# Patient Record
Sex: FEMALE | Race: BLACK OR AFRICAN AMERICAN | NOT HISPANIC OR LATINO
[De-identification: names, ages, dates, MRNs, and addresses within clinical notes are randomized per-mention and may not be internally consistent; named-entity substitution may affect disease eponyms.]

---

## 2019-10-21 ENCOUNTER — APPOINTMENT (OUTPATIENT)
Dept: UROLOGY | Facility: CLINIC | Age: 64
End: 2019-10-21

## 2019-10-21 PROBLEM — Z00.00 ENCOUNTER FOR PREVENTIVE HEALTH EXAMINATION: Status: ACTIVE | Noted: 2019-10-21

## 2019-11-04 ENCOUNTER — APPOINTMENT (OUTPATIENT)
Dept: UROLOGY | Facility: CLINIC | Age: 64
End: 2019-11-04
Payer: COMMERCIAL

## 2019-11-04 VITALS
BODY MASS INDEX: 26.49 KG/M2 | HEART RATE: 87 BPM | RESPIRATION RATE: 15 BRPM | WEIGHT: 159 LBS | SYSTOLIC BLOOD PRESSURE: 136 MMHG | HEIGHT: 65 IN | DIASTOLIC BLOOD PRESSURE: 80 MMHG

## 2019-11-04 DIAGNOSIS — Z86.39 PERSONAL HISTORY OF OTHER ENDOCRINE, NUTRITIONAL AND METABOLIC DISEASE: ICD-10-CM

## 2019-11-04 DIAGNOSIS — Z78.9 OTHER SPECIFIED HEALTH STATUS: ICD-10-CM

## 2019-11-04 DIAGNOSIS — Z87.42 PERSONAL HISTORY OF OTHER DISEASES OF THE FEMALE GENITAL TRACT: ICD-10-CM

## 2019-11-04 DIAGNOSIS — Z80.0 FAMILY HISTORY OF MALIGNANT NEOPLASM OF DIGESTIVE ORGANS: ICD-10-CM

## 2019-11-04 PROCEDURE — 99204 OFFICE O/P NEW MOD 45 MIN: CPT

## 2019-11-04 RX ORDER — SITAGLIPTIN AND METFORMIN HYDROCHLORIDE 50; 1000 MG/1; MG/1
TABLET, FILM COATED ORAL
Refills: 0 | Status: ACTIVE | COMMUNITY

## 2019-11-04 RX ORDER — GLIMEPIRIDE 4 MG/1
TABLET ORAL
Refills: 0 | Status: ACTIVE | COMMUNITY

## 2019-11-09 NOTE — HISTORY OF PRESENT ILLNESS
MONITOR response to TREATMENT. [FreeTextEntry1] : 63 yo F with history of back pain, mid lower back but sometimes travels to flank bilaterally\par Underwent CT as part of workup\par Found to have atrophic right kidney with a renal mass\par Per pt, has known about atrophic right kidney since 31 yrs ago\par no history of UTI\par no history of kidney stones

## 2019-11-09 NOTE — PHYSICAL EXAM
[General Appearance - Well Developed] : well developed [General Appearance - Well Nourished] : well nourished [Well Groomed] : well groomed [Normal Appearance] : normal appearance [Edema] : no peripheral edema [General Appearance - In No Acute Distress] : no acute distress [Exaggerated Use Of Accessory Muscles For Inspiration] : no accessory muscle use [Abdomen Soft] : soft [Respiration, Rhythm And Depth] : normal respiratory rhythm and effort [Abdomen Tenderness] : non-tender [Costovertebral Angle Tenderness] : no ~M costovertebral angle tenderness [Urinary Bladder Findings] : the bladder was normal on palpation [FreeTextEntry1] : well healed abdominal scar [No Focal Deficits] : no focal deficits [] : no rash [Normal Station and Gait] : the gait and station were normal for the patient's age [Affect] : the affect was normal [Oriented To Time, Place, And Person] : oriented to person, place, and time [Mood] : the mood was normal [Not Anxious] : not anxious [No Palpable Adenopathy] : no palpable adenopathy

## 2019-11-09 NOTE — ASSESSMENT
[FreeTextEntry1] : 63 yo F with right atrophic kidney with renal mass\par \par - Reviewed CT imaging. Confirmed reported findings. Of note, pt's left kidney has compensatory hypertrophy\par - Discussed options with pt. Given atrophy, would recommend radical nephrectomy even though renal mass is small. In relation to atrophic kidney, it takes up about a third. Discussed all risks and benefits. Pt would like to proceed\par - Schedule laparoscopic right radical nephrectomy in the near future\par

## 2019-11-11 LAB
APPEARANCE: CLEAR
BACTERIA UR CULT: NORMAL
BACTERIA: NEGATIVE
BILIRUBIN URINE: NEGATIVE
BLOOD URINE: NEGATIVE
COLOR: NORMAL
GLUCOSE QUALITATIVE U: ABNORMAL
HYALINE CASTS: 2 /LPF
KETONES URINE: NEGATIVE
LEUKOCYTE ESTERASE URINE: NEGATIVE
MICROSCOPIC-UA: NORMAL
NITRITE URINE: NEGATIVE
PH URINE: 6.5
PROTEIN URINE: NEGATIVE
RED BLOOD CELLS URINE: 4 /HPF
SPECIFIC GRAVITY URINE: 1.02
SQUAMOUS EPITHELIAL CELLS: 2 /HPF
UROBILINOGEN URINE: NORMAL
WHITE BLOOD CELLS URINE: 3 /HPF

## 2020-01-02 ENCOUNTER — OUTPATIENT (OUTPATIENT)
Dept: OUTPATIENT SERVICES | Facility: HOSPITAL | Age: 65
LOS: 1 days | End: 2020-01-02
Payer: COMMERCIAL

## 2020-01-02 VITALS
OXYGEN SATURATION: 98 % | RESPIRATION RATE: 17 BRPM | HEART RATE: 75 BPM | TEMPERATURE: 98 F | HEIGHT: 65 IN | SYSTOLIC BLOOD PRESSURE: 105 MMHG | WEIGHT: 162.92 LBS | DIASTOLIC BLOOD PRESSURE: 71 MMHG

## 2020-01-02 DIAGNOSIS — Z98.891 HISTORY OF UTERINE SCAR FROM PREVIOUS SURGERY: Chronic | ICD-10-CM

## 2020-01-02 DIAGNOSIS — Z90.710 ACQUIRED ABSENCE OF BOTH CERVIX AND UTERUS: Chronic | ICD-10-CM

## 2020-01-02 DIAGNOSIS — Z01.818 ENCOUNTER FOR OTHER PREPROCEDURAL EXAMINATION: ICD-10-CM

## 2020-01-02 DIAGNOSIS — Z98.890 OTHER SPECIFIED POSTPROCEDURAL STATES: Chronic | ICD-10-CM

## 2020-01-02 DIAGNOSIS — E11.9 TYPE 2 DIABETES MELLITUS WITHOUT COMPLICATIONS: ICD-10-CM

## 2020-01-02 DIAGNOSIS — N28.89 OTHER SPECIFIED DISORDERS OF KIDNEY AND URETER: ICD-10-CM

## 2020-01-02 LAB
APPEARANCE UR: CLEAR — SIGNIFICANT CHANGE UP
BILIRUB UR-MCNC: NEGATIVE — SIGNIFICANT CHANGE UP
BLD GP AB SCN SERPL QL: SIGNIFICANT CHANGE UP
COLOR SPEC: YELLOW — SIGNIFICANT CHANGE UP
DIFF PNL FLD: NEGATIVE — SIGNIFICANT CHANGE UP
GLUCOSE UR QL: NEGATIVE — SIGNIFICANT CHANGE UP
KETONES UR-MCNC: NEGATIVE — SIGNIFICANT CHANGE UP
LEUKOCYTE ESTERASE UR-ACNC: NEGATIVE — SIGNIFICANT CHANGE UP
NITRITE UR-MCNC: NEGATIVE — SIGNIFICANT CHANGE UP
PH UR: 5 — SIGNIFICANT CHANGE UP (ref 5–8)
PROT UR-MCNC: NEGATIVE — SIGNIFICANT CHANGE UP
SP GR SPEC: 1.01 — SIGNIFICANT CHANGE UP (ref 1.01–1.02)
UROBILINOGEN FLD QL: NEGATIVE — SIGNIFICANT CHANGE UP

## 2020-01-02 PROCEDURE — G0463: CPT

## 2020-01-02 NOTE — H&P PST ADULT - NSICDXPASTSURGICALHX_GEN_ALL_CORE_FT
PAST SURGICAL HISTORY:  H/O  section one    H/O exploratory laparotomy for endometerosis    H/O total hysterectomy

## 2020-01-02 NOTE — H&P PST ADULT - NSICDXFAMILYHX_GEN_ALL_CORE_FT
FAMILY HISTORY:  Family history of diabetes mellitus (DM), mother and father  Family history of diabetes mellitus (DM), sister  FHx: colon cancer, father

## 2020-01-02 NOTE — H&P PST ADULT - HISTORY OF PRESENT ILLNESS
64 year old Black female with history right kidney hydronephrosis, and  a diabetic. Pt had seen her MD for back pain sometime in October.  Pt had several tests a sonogram revealed something on right kidney. An IVP showed a tumor not stones on the right kidney. Pt had a consultation with her doctor and was referred to Dr. Fink for possible surgery. Pt schedule for Laparoscopic right radical nephrectomy on 1/14/2020. Pt instructed to take 22 u of Levemir the night before surgery, pt verbalized understanding and reason why this amount is important.

## 2020-01-02 NOTE — H&P PST ADULT - RS GEN PE MLT RESP DETAILS PC
airway patent/clear to auscultation bilaterally/good air movement/normal/breath sounds equal/respirations non-labored

## 2020-01-02 NOTE — H&P PST ADULT - AIRWAY
normal Niacinamide Pregnancy And Lactation Text: These medications are considered safe during pregnancy.

## 2020-01-02 NOTE — H&P PST ADULT - NSICDXPROBLEM_GEN_ALL_CORE_FT
PROBLEM DIAGNOSES  Problem: Other specified disorders of kidney and ureter  Assessment and Plan: Schedule for laparoscopic right radical nephrectomy    Problem: DM (diabetes mellitus)  Assessment and Plan: monitor glucose pre and post procedure continue antidiabetic meds after surgery.

## 2020-01-02 NOTE — H&P PST ADULT - NEGATIVE OPHTHALMOLOGIC SYMPTOMS
no diplopia/no lacrimation L/no lacrimation R/no discharge L/no blurred vision R/no photophobia/no blurred vision L

## 2020-01-02 NOTE — H&P PST ADULT - NEUROLOGICAL DETAILS
alert and oriented x 3/responds to pain/sensation intact/deep reflexes intact/responds to verbal commands

## 2020-01-03 LAB
CULTURE RESULTS: SIGNIFICANT CHANGE UP
SPECIMEN SOURCE: SIGNIFICANT CHANGE UP

## 2020-01-14 ENCOUNTER — APPOINTMENT (OUTPATIENT)
Dept: UROLOGY | Facility: HOSPITAL | Age: 65
End: 2020-01-14

## 2020-01-14 ENCOUNTER — INPATIENT (INPATIENT)
Facility: HOSPITAL | Age: 65
LOS: 0 days | Discharge: HOME | DRG: 661 | End: 2020-01-15
Attending: UROLOGY | Admitting: UROLOGY
Payer: COMMERCIAL

## 2020-01-14 ENCOUNTER — RESULT REVIEW (OUTPATIENT)
Age: 65
End: 2020-01-14

## 2020-01-14 VITALS
RESPIRATION RATE: 17 BRPM | WEIGHT: 162.92 LBS | SYSTOLIC BLOOD PRESSURE: 105 MMHG | DIASTOLIC BLOOD PRESSURE: 71 MMHG | HEIGHT: 65 IN | HEART RATE: 75 BPM | OXYGEN SATURATION: 98 % | TEMPERATURE: 98 F

## 2020-01-14 DIAGNOSIS — Z90.710 ACQUIRED ABSENCE OF BOTH CERVIX AND UTERUS: Chronic | ICD-10-CM

## 2020-01-14 DIAGNOSIS — N28.89 OTHER SPECIFIED DISORDERS OF KIDNEY AND URETER: ICD-10-CM

## 2020-01-14 DIAGNOSIS — Z98.891 HISTORY OF UTERINE SCAR FROM PREVIOUS SURGERY: Chronic | ICD-10-CM

## 2020-01-14 DIAGNOSIS — Z98.890 OTHER SPECIFIED POSTPROCEDURAL STATES: Chronic | ICD-10-CM

## 2020-01-14 LAB — BLD GP AB SCN SERPL QL: SIGNIFICANT CHANGE UP

## 2020-01-14 PROCEDURE — 88307 TISSUE EXAM BY PATHOLOGIST: CPT | Mod: 26

## 2020-01-14 PROCEDURE — 50545 LAPARO RADICAL NEPHRECTOMY: CPT | Mod: AS,RT

## 2020-01-14 PROCEDURE — 50545 LAPARO RADICAL NEPHRECTOMY: CPT | Mod: RT

## 2020-01-14 RX ORDER — INSULIN GLARGINE 100 [IU]/ML
30 INJECTION, SOLUTION SUBCUTANEOUS AT BEDTIME
Refills: 0 | Status: DISCONTINUED | OUTPATIENT
Start: 2020-01-14 | End: 2020-01-15

## 2020-01-14 RX ORDER — SITAGLIPTIN AND METFORMIN HYDROCHLORIDE 500; 50 MG/1; MG/1
1 TABLET, FILM COATED ORAL
Qty: 0 | Refills: 0 | DISCHARGE

## 2020-01-14 RX ORDER — ONDANSETRON 8 MG/1
4 TABLET, FILM COATED ORAL ONCE
Refills: 0 | Status: DISCONTINUED | OUTPATIENT
Start: 2020-01-14 | End: 2020-01-14

## 2020-01-14 RX ORDER — THIAMINE MONONITRATE (VIT B1) 100 MG
300 TABLET ORAL
Qty: 0 | Refills: 0 | DISCHARGE

## 2020-01-14 RX ORDER — INSULIN DETEMIR 100/ML (3)
30 INSULIN PEN (ML) SUBCUTANEOUS AT BEDTIME
Refills: 0 | Status: DISCONTINUED | OUTPATIENT
Start: 2020-01-14 | End: 2020-01-14

## 2020-01-14 RX ORDER — HYDROMORPHONE HYDROCHLORIDE 2 MG/ML
0.5 INJECTION INTRAMUSCULAR; INTRAVENOUS; SUBCUTANEOUS
Refills: 0 | Status: DISCONTINUED | OUTPATIENT
Start: 2020-01-14 | End: 2020-01-14

## 2020-01-14 RX ORDER — OXYCODONE AND ACETAMINOPHEN 5; 325 MG/1; MG/1
1 TABLET ORAL EVERY 4 HOURS
Refills: 0 | Status: DISCONTINUED | OUTPATIENT
Start: 2020-01-14 | End: 2020-01-15

## 2020-01-14 RX ORDER — CEFAZOLIN SODIUM 1 G
2000 VIAL (EA) INJECTION EVERY 8 HOURS
Refills: 0 | Status: COMPLETED | OUTPATIENT
Start: 2020-01-14 | End: 2020-01-14

## 2020-01-14 RX ORDER — GLUCAGON INJECTION, SOLUTION 0.5 MG/.1ML
1 INJECTION, SOLUTION SUBCUTANEOUS ONCE
Refills: 0 | Status: DISCONTINUED | OUTPATIENT
Start: 2020-01-14 | End: 2020-01-15

## 2020-01-14 RX ORDER — INSULIN LISPRO 100/ML
VIAL (ML) SUBCUTANEOUS
Refills: 0 | Status: DISCONTINUED | OUTPATIENT
Start: 2020-01-14 | End: 2020-01-15

## 2020-01-14 RX ORDER — SODIUM CHLORIDE 9 MG/ML
1000 INJECTION, SOLUTION INTRAVENOUS
Refills: 0 | Status: DISCONTINUED | OUTPATIENT
Start: 2020-01-14 | End: 2020-01-14

## 2020-01-14 RX ORDER — ACETAMINOPHEN 500 MG
1000 TABLET ORAL ONCE
Refills: 0 | Status: COMPLETED | OUTPATIENT
Start: 2020-01-14 | End: 2020-01-14

## 2020-01-14 RX ORDER — DEXTROSE 50 % IN WATER 50 %
12.5 SYRINGE (ML) INTRAVENOUS ONCE
Refills: 0 | Status: DISCONTINUED | OUTPATIENT
Start: 2020-01-14 | End: 2020-01-15

## 2020-01-14 RX ORDER — SODIUM CHLORIDE 9 MG/ML
3 INJECTION INTRAMUSCULAR; INTRAVENOUS; SUBCUTANEOUS EVERY 8 HOURS
Refills: 0 | Status: DISCONTINUED | OUTPATIENT
Start: 2020-01-14 | End: 2020-01-14

## 2020-01-14 RX ORDER — DEXTROSE 50 % IN WATER 50 %
15 SYRINGE (ML) INTRAVENOUS ONCE
Refills: 0 | Status: DISCONTINUED | OUTPATIENT
Start: 2020-01-14 | End: 2020-01-15

## 2020-01-14 RX ORDER — HEPARIN SODIUM 5000 [USP'U]/ML
5000 INJECTION INTRAVENOUS; SUBCUTANEOUS EVERY 8 HOURS
Refills: 0 | Status: DISCONTINUED | OUTPATIENT
Start: 2020-01-14 | End: 2020-01-15

## 2020-01-14 RX ORDER — DEXTROSE 50 % IN WATER 50 %
25 SYRINGE (ML) INTRAVENOUS ONCE
Refills: 0 | Status: DISCONTINUED | OUTPATIENT
Start: 2020-01-14 | End: 2020-01-15

## 2020-01-14 RX ORDER — INSULIN DETEMIR 100/ML (3)
30 INSULIN PEN (ML) SUBCUTANEOUS
Qty: 0 | Refills: 0 | DISCHARGE

## 2020-01-14 RX ORDER — SODIUM CHLORIDE 9 MG/ML
1000 INJECTION, SOLUTION INTRAVENOUS
Refills: 0 | Status: DISCONTINUED | OUTPATIENT
Start: 2020-01-14 | End: 2020-01-15

## 2020-01-14 RX ORDER — GLIMEPIRIDE 1 MG
1 TABLET ORAL
Qty: 0 | Refills: 0 | DISCHARGE

## 2020-01-14 RX ORDER — MORPHINE SULFATE 50 MG/1
2 CAPSULE, EXTENDED RELEASE ORAL EVERY 6 HOURS
Refills: 0 | Status: DISCONTINUED | OUTPATIENT
Start: 2020-01-14 | End: 2020-01-15

## 2020-01-14 RX ORDER — SODIUM CHLORIDE 9 MG/ML
1000 INJECTION INTRAMUSCULAR; INTRAVENOUS; SUBCUTANEOUS
Refills: 0 | Status: DISCONTINUED | OUTPATIENT
Start: 2020-01-14 | End: 2020-01-15

## 2020-01-14 RX ORDER — ONDANSETRON 8 MG/1
4 TABLET, FILM COATED ORAL EVERY 6 HOURS
Refills: 0 | Status: DISCONTINUED | OUTPATIENT
Start: 2020-01-14 | End: 2020-01-15

## 2020-01-14 RX ADMIN — SODIUM CHLORIDE 3 MILLILITER(S): 9 INJECTION INTRAMUSCULAR; INTRAVENOUS; SUBCUTANEOUS at 08:03

## 2020-01-14 RX ADMIN — SODIUM CHLORIDE 120 MILLILITER(S): 9 INJECTION INTRAMUSCULAR; INTRAVENOUS; SUBCUTANEOUS at 22:39

## 2020-01-14 RX ADMIN — INSULIN GLARGINE 30 UNIT(S): 100 INJECTION, SOLUTION SUBCUTANEOUS at 22:40

## 2020-01-14 RX ADMIN — HYDROMORPHONE HYDROCHLORIDE 0.5 MILLIGRAM(S): 2 INJECTION INTRAMUSCULAR; INTRAVENOUS; SUBCUTANEOUS at 12:25

## 2020-01-14 RX ADMIN — HEPARIN SODIUM 5000 UNIT(S): 5000 INJECTION INTRAVENOUS; SUBCUTANEOUS at 22:40

## 2020-01-14 RX ADMIN — Medication 100 MILLIGRAM(S): at 16:43

## 2020-01-14 RX ADMIN — Medication 400 MILLIGRAM(S): at 12:04

## 2020-01-14 RX ADMIN — Medication 1000 MILLIGRAM(S): at 12:24

## 2020-01-14 RX ADMIN — HYDROMORPHONE HYDROCHLORIDE 0.5 MILLIGRAM(S): 2 INJECTION INTRAMUSCULAR; INTRAVENOUS; SUBCUTANEOUS at 12:39

## 2020-01-14 RX ADMIN — Medication 4: at 23:49

## 2020-01-14 NOTE — PROGRESS NOTE ADULT - ASSESSMENT
s/p laparoscopic Right nephrectomy, doing well.  1. Sips/ice chips tonight  2. Possible clear liquid diet in AM if passes flatus  3. OOB to chair in AM  4. Monitor urine output  5. Pain control  6. Ancef 2g x 1 s/p laparoscopic Right nephrectomy, doing well.  1. Sips/ice chips tonight  2. Possible clear liquid diet in AM if passes flatus  3. OOB to chair in AM  4. Monitor urine output  5. Pain control  6. Ancef 2g x 1  7. ISS  8. Levemir 30 units at bedtime

## 2020-01-14 NOTE — PROGRESS NOTE ADULT - SUBJECTIVE AND OBJECTIVE BOX
Surgery Post-Operative Note    Subjective:  Patient seen at bedside        T(C): 36.8 (01-14-20 @ 15:00), Max: 37.9 (01-14-20 @ 11:36)  HR: 79 (01-14-20 @ 16:15) (74 - 87)  BP: 128/68 (01-14-20 @ 16:15) (105/71 - 136/80)  RR: 14 (01-14-20 @ 16:15) (12 - 18)  SpO2: 96% (01-14-20 @ 16:15) (95% - 100%)  Wt(kg): --    Physical Exam:    Gen: awake, alert oriented NAD  Abd: mildly obese, soft, incisional tenderness, surgical wound dressings with minimal serosangunious soilage.   Pelvic: Suh catheter in place with clear urine  Ext: warm to touch no c/c/e    MEDICATIONS  (STANDING):  heparin  Injectable 5000 Unit(s) SubCutaneous every 8 hours  lactated ringers. 1000 milliLiter(s) (75 mL/Hr) IV Continuous <Continuous>    MEDICATIONS  (PRN):  HYDROmorphone  Injectable 0.5 milliGRAM(s) IV Push every 10 minutes PRN Moderate Pain (4 - 6)  morphine  - Injectable 2 milliGRAM(s) IV Push every 6 hours PRN Severe Pain (7 - 10)  ondansetron Injectable 4 milliGRAM(s) IV Push every 6 hours PRN Nausea  ondansetron Injectable 4 milliGRAM(s) IV Push once PRN Nausea and/or Vomiting  oxycodone    5 mG/acetaminophen 325 mG 1 Tablet(s) Oral every 4 hours PRN Mild Pain (1 - 3)          I&O's Detail    14 Jan 2020 07:01  -  14 Jan 2020 17:06  --------------------------------------------------------  IN:    IV PiggyBack: 1400 mL    lactated ringers.: 450 mL  Total IN: 1850 mL    OUT:    Indwelling Catheter - Urethral: 500 mL  Total OUT: 500 mL    Total NET: 1350 mL Surgery Post-Operative Note    Subjective:  Patient seen at bedside  States she is hungry  Denies abdominal pain currently  Denies nausea or vomiting      T(C): 36.8 (01-14-20 @ 15:00), Max: 37.9 (01-14-20 @ 11:36)  HR: 79 (01-14-20 @ 16:15) (74 - 87)  BP: 128/68 (01-14-20 @ 16:15) (105/71 - 136/80)  RR: 14 (01-14-20 @ 16:15) (12 - 18)  SpO2: 96% (01-14-20 @ 16:15) (95% - 100%)  Wt(kg): --    Physical Exam:    Gen: awake, alert oriented NAD  Abd: mildly obese, soft, incisional tenderness, surgical wound dressings with minimal serosangunious soilage.   Pelvic: Suh catheter in place with clear urine  Ext: warm to touch no c/c/e    MEDICATIONS  (STANDING):  heparin  Injectable 5000 Unit(s) SubCutaneous every 8 hours  lactated ringers. 1000 milliLiter(s) (75 mL/Hr) IV Continuous <Continuous>    MEDICATIONS  (PRN):  HYDROmorphone  Injectable 0.5 milliGRAM(s) IV Push every 10 minutes PRN Moderate Pain (4 - 6)  morphine  - Injectable 2 milliGRAM(s) IV Push every 6 hours PRN Severe Pain (7 - 10)  ondansetron Injectable 4 milliGRAM(s) IV Push every 6 hours PRN Nausea  ondansetron Injectable 4 milliGRAM(s) IV Push once PRN Nausea and/or Vomiting  oxycodone    5 mG/acetaminophen 325 mG 1 Tablet(s) Oral every 4 hours PRN Mild Pain (1 - 3)          I&O's Detail    14 Jan 2020 07:01  -  14 Jan 2020 17:06  --------------------------------------------------------  IN:    IV PiggyBack: 1400 mL    lactated ringers.: 450 mL  Total IN: 1850 mL    OUT:    Indwelling Catheter - Urethral: 500 mL  Total OUT: 500 mL    Total NET: 1350 mL

## 2020-01-15 ENCOUNTER — TRANSCRIPTION ENCOUNTER (OUTPATIENT)
Age: 65
End: 2020-01-15

## 2020-01-15 VITALS
DIASTOLIC BLOOD PRESSURE: 70 MMHG | RESPIRATION RATE: 18 BRPM | TEMPERATURE: 98 F | OXYGEN SATURATION: 100 % | SYSTOLIC BLOOD PRESSURE: 141 MMHG

## 2020-01-15 LAB
ANION GAP SERPL CALC-SCNC: 4 MMOL/L — LOW (ref 5–17)
BASOPHILS # BLD AUTO: 0.04 K/UL — SIGNIFICANT CHANGE UP (ref 0–0.2)
BASOPHILS NFR BLD AUTO: 0.4 % — SIGNIFICANT CHANGE UP (ref 0–2)
BUN SERPL-MCNC: 12 MG/DL — SIGNIFICANT CHANGE UP (ref 7–18)
CALCIUM SERPL-MCNC: 8.5 MG/DL — SIGNIFICANT CHANGE UP (ref 8.4–10.5)
CHLORIDE SERPL-SCNC: 109 MMOL/L — HIGH (ref 96–108)
CO2 SERPL-SCNC: 29 MMOL/L — SIGNIFICANT CHANGE UP (ref 22–31)
CREAT SERPL-MCNC: 0.74 MG/DL — SIGNIFICANT CHANGE UP (ref 0.5–1.3)
EOSINOPHIL # BLD AUTO: 0.01 K/UL — SIGNIFICANT CHANGE UP (ref 0–0.5)
EOSINOPHIL NFR BLD AUTO: 0.1 % — SIGNIFICANT CHANGE UP (ref 0–6)
GLUCOSE SERPL-MCNC: 131 MG/DL — HIGH (ref 70–99)
HBA1C BLD-MCNC: 8.4 % — HIGH (ref 4–5.6)
HCT VFR BLD CALC: 32.4 % — LOW (ref 34.5–45)
HGB BLD-MCNC: 10.6 G/DL — LOW (ref 11.5–15.5)
IMM GRANULOCYTES NFR BLD AUTO: 0.3 % — SIGNIFICANT CHANGE UP (ref 0–1.5)
LYMPHOCYTES # BLD AUTO: 3.27 K/UL — SIGNIFICANT CHANGE UP (ref 1–3.3)
LYMPHOCYTES # BLD AUTO: 30.2 % — SIGNIFICANT CHANGE UP (ref 13–44)
MCHC RBC-ENTMCNC: 31.4 PG — SIGNIFICANT CHANGE UP (ref 27–34)
MCHC RBC-ENTMCNC: 32.7 GM/DL — SIGNIFICANT CHANGE UP (ref 32–36)
MCV RBC AUTO: 95.9 FL — SIGNIFICANT CHANGE UP (ref 80–100)
MONOCYTES # BLD AUTO: 1.03 K/UL — HIGH (ref 0–0.9)
MONOCYTES NFR BLD AUTO: 9.5 % — SIGNIFICANT CHANGE UP (ref 2–14)
NEUTROPHILS # BLD AUTO: 6.44 K/UL — SIGNIFICANT CHANGE UP (ref 1.8–7.4)
NEUTROPHILS NFR BLD AUTO: 59.5 % — SIGNIFICANT CHANGE UP (ref 43–77)
NRBC # BLD: 0 /100 WBCS — SIGNIFICANT CHANGE UP (ref 0–0)
PLATELET # BLD AUTO: 307 K/UL — SIGNIFICANT CHANGE UP (ref 150–400)
POTASSIUM SERPL-MCNC: 3.7 MMOL/L — SIGNIFICANT CHANGE UP (ref 3.5–5.3)
POTASSIUM SERPL-SCNC: 3.7 MMOL/L — SIGNIFICANT CHANGE UP (ref 3.5–5.3)
RBC # BLD: 3.38 M/UL — LOW (ref 3.8–5.2)
RBC # FLD: 12.6 % — SIGNIFICANT CHANGE UP (ref 10.3–14.5)
SODIUM SERPL-SCNC: 142 MMOL/L — SIGNIFICANT CHANGE UP (ref 135–145)
WBC # BLD: 10.82 K/UL — HIGH (ref 3.8–10.5)
WBC # FLD AUTO: 10.82 K/UL — HIGH (ref 3.8–10.5)

## 2020-01-15 PROCEDURE — 80048 BASIC METABOLIC PNL TOTAL CA: CPT

## 2020-01-15 PROCEDURE — 86850 RBC ANTIBODY SCREEN: CPT

## 2020-01-15 PROCEDURE — 85027 COMPLETE CBC AUTOMATED: CPT

## 2020-01-15 PROCEDURE — 86901 BLOOD TYPING SEROLOGIC RH(D): CPT

## 2020-01-15 PROCEDURE — 86923 COMPATIBILITY TEST ELECTRIC: CPT

## 2020-01-15 PROCEDURE — C1889: CPT

## 2020-01-15 PROCEDURE — 88307 TISSUE EXAM BY PATHOLOGIST: CPT

## 2020-01-15 PROCEDURE — 36415 COLL VENOUS BLD VENIPUNCTURE: CPT

## 2020-01-15 PROCEDURE — 86900 BLOOD TYPING SEROLOGIC ABO: CPT

## 2020-01-15 PROCEDURE — 83036 HEMOGLOBIN GLYCOSYLATED A1C: CPT

## 2020-01-15 PROCEDURE — 99024 POSTOP FOLLOW-UP VISIT: CPT | Mod: AI

## 2020-01-15 PROCEDURE — 82962 GLUCOSE BLOOD TEST: CPT

## 2020-01-15 RX ORDER — ACETAMINOPHEN WITH CODEINE 300MG-30MG
1 TABLET ORAL
Qty: 12 | Refills: 0
Start: 2020-01-15 | End: 2020-01-17

## 2020-01-15 RX ORDER — TRAMADOL HYDROCHLORIDE 50 MG/1
1 TABLET ORAL
Qty: 12 | Refills: 0
Start: 2020-01-15 | End: 2020-01-17

## 2020-01-15 RX ADMIN — Medication 4: at 16:47

## 2020-01-15 RX ADMIN — HEPARIN SODIUM 5000 UNIT(S): 5000 INJECTION INTRAVENOUS; SUBCUTANEOUS at 06:00

## 2020-01-15 RX ADMIN — HEPARIN SODIUM 5000 UNIT(S): 5000 INJECTION INTRAVENOUS; SUBCUTANEOUS at 15:19

## 2020-01-15 NOTE — DISCHARGE NOTE PROVIDER - CARE PROVIDER_API CALL
Yazmin Fink (MD; MPH)  Urology  [address] [place] [address]  [place] [address]  Phone: [phone]  Fax: [phone]  Follow Up Time: 1 week

## 2020-01-15 NOTE — DISCHARGE NOTE PROVIDER - NSDCMRMEDTOKEN_GEN_ALL_CORE_FT
clobetasol 0.05% topical cream: Apply topically to affected area 2 times a day heel of left foot  glimepiride 4 mg oral tablet: 1 tab(s) orally once a day  Janumet 50 mg-1000 mg oral tablet: 1 tab(s) orally 2 times a day  Levemir FlexTouch 100 units/mL subcutaneous solution: 30  subcutaneous once a day (at bedtime)  Ultram 50 mg oral tablet: 1 tab(s) orally every 6 hours, As Needed -for moderate pain MDD:4 tabs   Vitamin B1: 300  orally once a day clobetasol 0.05% topical cream: Apply topically to affected area 2 times a day heel of left foot  glimepiride 4 mg oral tablet: 1 tab(s) orally once a day  Janumet 50 mg-1000 mg oral tablet: 1 tab(s) orally 2 times a day  Levemir FlexTouch 100 units/mL subcutaneous solution: 30  subcutaneous once a day (at bedtime)  Tylenol with Codeine #3 oral tablet: 1 tab(s) orally every 6 hours, As Needed MDD:4 tabs   Vitamin B1: 300  orally once a day

## 2020-01-15 NOTE — DISCHARGE NOTE NURSING/CASE MANAGEMENT/SOCIAL WORK - PATIENT PORTAL LINK FT
You can access the FollowMyHealth Patient Portal offered by [facility] by registering at the following website: [email or web address]. By joining [employer]’s FollowMyHealth portal, you will also be able to view your health information using other applications (apps) compatible with our system.

## 2020-01-15 NOTE — DISCHARGE NOTE PROVIDER - HOSPITAL COURSE
64 year old Black female with history right kidney hydronephrosis, and  a diabetic. Pt had seen her MD for back pain sometime in October.  Pt had several tests a sonogram revealed something on right kidney. An IVP showed a tumor not stones on the right kidney. Pt had a consultation with her doctor and was referred to Dr. Fink. Patient is now s/p  Laparoscopic right radical nephrectomy on 1/14/2020. Patient tolerated procedure well. Diet was advanced and tolerated. Patient for d/c home with outpatient follow up

## 2020-01-15 NOTE — PROGRESS NOTE ADULT - SUBJECTIVE AND OBJECTIVE BOX
Patient seen and examined at bedside  Denies abdominal pain, + soreness   Denies nausea or vomiting  + flatus denies bm     Vital Signs Last 24 Hrs  T(C): 36.8 (15 Maik 2020 05:10), Max: 37.9 (14 Jan 2020 11:36)  T(F): 98.2 (15 Maik 2020 05:10), Max: 100.2 (14 Jan 2020 11:36)  HR: 88 (15 Maik 2020 05:10) (74 - 90)  BP: 104/53 (15 Maik 2020 05:10) (103/57 - 137/78)  BP(mean): 83 (14 Jan 2020 16:15) (76 - 91)  RR: 18 (15 Maik 2020 05:10) (12 - 18)  SpO2: 97% (15 Maik 2020 05:10) (95% - 100%)  Physical Exam:    Gen: awake, alert oriented NAD  Abd: mildly obese, soft, incisional tenderness, surgical wound dressings with minimal serosangunious soilage.   Pelvic: Suh catheter in place with clear urine  Ext: warm to touch no c/c/e    MEDICATIONS  (STANDING):  heparin  Injectable 5000 Unit(s) SubCutaneous every 8 hours  lactated ringers. 1000 milliLiter(s) (75 mL/Hr) IV Continuous <Continuous>    MEDICATIONS  (PRN):  HYDROmorphone  Injectable 0.5 milliGRAM(s) IV Push every 10 minutes PRN Moderate Pain (4 - 6)  morphine  - Injectable 2 milliGRAM(s) IV Push every 6 hours PRN Severe Pain (7 - 10)  ondansetron Injectable 4 milliGRAM(s) IV Push every 6 hours PRN Nausea  ondansetron Injectable 4 milliGRAM(s) IV Push once PRN Nausea and/or Vomiting  oxycodone    5 mG/acetaminophen 325 mG 1 Tablet(s) Oral every 4 hours PRN Mild Pain (1 - 3)    I&O's Detail    14 Jan 2020 07:01  -  15 Maik 2020 07:00  --------------------------------------------------------  IN:    IV PiggyBack: 1400 mL    lactated ringers.: 450 mL    sodium chloride 0.9%.: 1440 mL  Total IN: 3290 mL    OUT:    Indwelling Catheter - Urethral: 1600 mL  Total OUT: 1600 mL    Total NET: 1690 mL

## 2020-01-15 NOTE — PROGRESS NOTE ADULT - ASSESSMENT
s/p laparoscopic Right nephrectomy POD #1     1.  clear liquid diet   2. OOB to chair in AM  3. d/c ybarra  4. prn pain control

## 2020-01-15 NOTE — DISCHARGE NOTE NURSING/CASE MANAGEMENT/SOCIAL WORK - NSDCPNINST_GEN_ALL_CORE
keep the steristrips in you abdomen clean and dry; may wet during shower, pat dry after. do not remove let it fall out by itself, until seen by surgeon  Follow up with Surgeon

## 2020-01-15 NOTE — DISCHARGE NOTE PROVIDER - NSDCCPCAREPLAN_GEN_ALL_CORE_FT
PRINCIPAL DISCHARGE DIAGNOSIS  Diagnosis: Other specified disorders of kidney and ureter  Assessment and Plan of Treatment: s/p nephrectomy PRINCIPAL DISCHARGE DIAGNOSIS  Diagnosis: Other specified disorders of kidney and ureter  Assessment and Plan of Treatment: s/p R nephrectomy  Diet as tolerated   No heavy lifting or straining   May shower on 1/16  Please follow up with Dr. Fink within 1 week

## 2020-01-21 PROBLEM — N28.89 OTHER SPECIFIED DISORDERS OF KIDNEY AND URETER: Chronic | Status: ACTIVE | Noted: 2020-01-02

## 2020-01-21 PROBLEM — E11.9 TYPE 2 DIABETES MELLITUS WITHOUT COMPLICATIONS: Chronic | Status: ACTIVE | Noted: 2020-01-02

## 2020-01-27 ENCOUNTER — APPOINTMENT (OUTPATIENT)
Dept: UROLOGY | Facility: CLINIC | Age: 65
End: 2020-01-27
Payer: COMMERCIAL

## 2020-01-27 VITALS
WEIGHT: 159 LBS | BODY MASS INDEX: 26.49 KG/M2 | HEIGHT: 65 IN | DIASTOLIC BLOOD PRESSURE: 81 MMHG | SYSTOLIC BLOOD PRESSURE: 128 MMHG

## 2020-01-27 PROCEDURE — 99024 POSTOP FOLLOW-UP VISIT: CPT

## 2020-01-27 NOTE — HISTORY OF PRESENT ILLNESS
[FreeTextEntry1] : 63 yo F with history of back pain, mid lower back but sometimes travels to flank bilaterally\par Underwent CT as part of workup\par Found to have atrophic right kidney with a renal mass\par Per pt, has known about atrophic right kidney since 31 yrs ago\par no history of UTI\par no history of kidney stones\par \par 1/27/20 Interval history: Pt is s/p right radical nephrectomy\par Continues to have some incisional pain which sometimes makes it difficult for her to be in certain positions

## 2020-01-27 NOTE — ASSESSMENT
[FreeTextEntry1] : 65 yo F s/p radical nephrectomy. Final pathology showed papillary RCC\par \par - Reviewed results with pt\par - BUN/Cr\par - FU in 3 months with repeat CT\par - OK to return to work in one week

## 2020-01-27 NOTE — PHYSICAL EXAM

## 2020-02-03 LAB
BUN SERPL-MCNC: 11 MG/DL
CREAT SERPL-MCNC: 0.74 MG/DL

## 2020-11-12 ENCOUNTER — APPOINTMENT (OUTPATIENT)
Age: 65
End: 2020-11-12
Payer: COMMERCIAL

## 2020-11-12 VITALS
WEIGHT: 160 LBS | HEIGHT: 65 IN | DIASTOLIC BLOOD PRESSURE: 78 MMHG | SYSTOLIC BLOOD PRESSURE: 116 MMHG | BODY MASS INDEX: 26.66 KG/M2 | HEART RATE: 81 BPM | TEMPERATURE: 97.7 F

## 2020-11-12 PROCEDURE — 99214 OFFICE O/P EST MOD 30 MIN: CPT

## 2020-11-12 PROCEDURE — 99072 ADDL SUPL MATRL&STAF TM PHE: CPT

## 2020-11-15 NOTE — ASSESSMENT
[FreeTextEntry1] : 64 yo M with history of RCC s/p lap radical nephrectomy\par \par - Discussed importance of DM2 control given that she now has a solitary kidney\par - Reviewed last CT results which showed no evidence of recurrence\par - FU in 6 months with repeat CT

## 2020-11-15 NOTE — HISTORY OF PRESENT ILLNESS
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\par Per pt, has known about atrophic right kidney since 31 yrs ago\par no history of UTI\par no history of kidney stones\par \par 1/27/20 Interval history: Pt is s/p right radical nephrectomy\par Continues to have some incisional pain which sometimes makes it difficult for her to be in certain positions\par \par 11/12/20 Interval history: No symptoms since last visit\par hgba1c 8.0, trying to get it under better control\par last bloodwork done by PCP in Oct was otherwise OK

## 2020-11-15 NOTE — PHYSICAL EXAM

## 2021-03-11 ENCOUNTER — APPOINTMENT (OUTPATIENT)
Dept: UROLOGY | Facility: CLINIC | Age: 66
End: 2021-03-11
Payer: COMMERCIAL

## 2021-03-11 DIAGNOSIS — N28.89 OTHER SPECIFIED DISORDERS OF KIDNEY AND URETER: ICD-10-CM

## 2021-03-11 PROCEDURE — 99213 OFFICE O/P EST LOW 20 MIN: CPT

## 2021-03-11 PROCEDURE — 99072 ADDL SUPL MATRL&STAF TM PHE: CPT

## 2021-03-14 PROBLEM — N28.89 RENAL MASS: Status: RESOLVED | Noted: 2019-11-04 | Resolved: 2021-03-14

## 2021-03-14 RX ORDER — CICLOPIROX OLAMINE 7.7 MG/ML
0.77 SUSPENSION TOPICAL
Qty: 60 | Refills: 0 | Status: COMPLETED | COMMUNITY
Start: 2019-12-19

## 2021-03-14 RX ORDER — INSULIN DETEMIR 100 [IU]/ML
100 INJECTION, SOLUTION SUBCUTANEOUS
Qty: 36 | Refills: 0 | Status: COMPLETED | COMMUNITY
Start: 2020-07-01

## 2021-03-14 NOTE — ASSESSMENT
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

## 2021-03-14 NOTE — PHYSICAL EXAM

## 2021-03-14 NOTE — HISTORY OF PRESENT ILLNESS
[FreeTextEntry1] : 63 yo F with history of back pain, mid lower back but sometimes travels to flank bilaterally\par Underwent CT as part of workup\par Found to have atrophic right kidney with a renal mass\par Per pt, has known about atrophic right kidney since 31 yrs ago\par no history of UTI\par no history of kidney stones\par \par 1/27/20 Interval history: Pt is s/p right radical nephrectomy\par Continues to have some incisional pain which sometimes makes it difficult for her to be in certain positions\par \par 11/12/20 Interval history: No symptoms since last visit\par hgba1c 8.0, trying to get it under better control\par last bloodwork done by PCP in Oct was otherwise OK\par \par 3/11/21 Interval history: DOing well\par No issues since last visit\par Admits she still has issues with DM2 control\par Recent CT done on 3/2/21 showed no evidence of recurrent disease\par

## 2021-09-13 ENCOUNTER — APPOINTMENT (OUTPATIENT)
Age: 66
End: 2021-09-13
Payer: COMMERCIAL

## 2021-09-13 VITALS
TEMPERATURE: 97.6 F | HEART RATE: 80 BPM | WEIGHT: 10 LBS | HEIGHT: 65 IN | DIASTOLIC BLOOD PRESSURE: 78 MMHG | BODY MASS INDEX: 1.67 KG/M2 | SYSTOLIC BLOOD PRESSURE: 118 MMHG

## 2021-09-13 PROCEDURE — 99213 OFFICE O/P EST LOW 20 MIN: CPT

## 2021-09-19 NOTE — HISTORY OF PRESENT ILLNESS
[FreeTextEntry1] : 63 yo F with history of back pain, mid lower back but sometimes travels to flank bilaterally\par Underwent CT as part of workup\par Found to have atrophic right kidney with a renal mass\par Per pt, has known about atrophic right kidney since 31 yrs ago\par no history of UTI\par no history of kidney stones\par \par 1/27/20 Interval history: Pt is s/p right radical nephrectomy\par Continues to have some incisional pain which sometimes makes it difficult for her to be in certain positions\par \par 11/12/20 Interval history: No symptoms since last visit\par hgba1c 8.0, trying to get it under better control\par last bloodwork done by PCP in Oct was otherwise OK\par \par 3/11/21 Interval history: DOing well\par No issues since last visit\par Admits she still has issues with DM2 control\par Recent CT done on 3/2/21 showed no evidence of recurrent disease\par \par 9/13/21 Interval history: some incisional numbness\par Otherwise doing well with no issues

## 2021-09-19 NOTE — PHYSICAL EXAM
[General Appearance - Well Developed] : well developed [General Appearance - Well Nourished] : well nourished [Well Groomed] : well groomed [Normal Appearance] : normal appearance [General Appearance - In No Acute Distress] : no acute distress [Abdomen Soft] : soft [Abdomen Tenderness] : non-tender [Costovertebral Angle Tenderness] : no ~M costovertebral angle tenderness [Urinary Bladder Findings] : the bladder was normal on palpation [FreeTextEntry1] : incisions c/d/i, keloid [Edema] : no peripheral edema [] : no respiratory distress [Respiration, Rhythm And Depth] : normal respiratory rhythm and effort [Exaggerated Use Of Accessory Muscles For Inspiration] : no accessory muscle use [Oriented To Time, Place, And Person] : oriented to person, place, and time [Affect] : the affect was normal [Not Anxious] : not anxious [Mood] : the mood was normal [Normal Station and Gait] : the gait and station were normal for the patient's age [No Focal Deficits] : no focal deficits [No Palpable Adenopathy] : no palpable adenopathy

## 2021-09-19 NOTE — ASSESSMENT
[FreeTextEntry1] : 67 yo F with history of RCC s/p nephrectomy\par \par - Reassured pt regarding incision. Can consider dermatology consult for her keloid\par - Next CT due in 6 months\par - FU in 6 months

## 2022-03-14 ENCOUNTER — APPOINTMENT (OUTPATIENT)
Dept: UROLOGY | Facility: CLINIC | Age: 67
End: 2022-03-14
Payer: COMMERCIAL

## 2022-03-14 PROCEDURE — 99213 OFFICE O/P EST LOW 20 MIN: CPT

## 2022-03-17 LAB
BUN SERPL-MCNC: 9 MG/DL
CREAT SERPL-MCNC: 0.74 MG/DL
EGFR: 90 ML/MIN/1.73M2

## 2022-03-20 NOTE — PHYSICAL EXAM
[General Appearance - Well Developed] : well developed [Normal Appearance] : normal appearance [General Appearance - Well Nourished] : well nourished [Well Groomed] : well groomed [General Appearance - In No Acute Distress] : no acute distress [Abdomen Soft] : soft [Abdomen Tenderness] : non-tender [FreeTextEntry1] : incisions c/d/i, keloid [Costovertebral Angle Tenderness] : no ~M costovertebral angle tenderness [Urinary Bladder Findings] : the bladder was normal on palpation [Edema] : no peripheral edema [] : no respiratory distress [Respiration, Rhythm And Depth] : normal respiratory rhythm and effort [Exaggerated Use Of Accessory Muscles For Inspiration] : no accessory muscle use [Oriented To Time, Place, And Person] : oriented to person, place, and time [Affect] : the affect was normal [Mood] : the mood was normal [Not Anxious] : not anxious [Normal Station and Gait] : the gait and station were normal for the patient's age [No Focal Deficits] : no focal deficits [No Palpable Adenopathy] : no palpable adenopathy

## 2022-03-20 NOTE — ASSESSMENT
[FreeTextEntry1] : 67 yo F s/p nephrectomy for RCC\par \par - Surveillance CT still pending. Will follow-up with business office regarding authorization\par - Discussed importance of adequate DM2 control given her history of solitary kidney\par - FU after CT

## 2022-03-20 NOTE — HISTORY OF PRESENT ILLNESS
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\par Per pt, has known about atrophic right kidney since 31 yrs ago\par no history of UTI\par no history of kidney stones\par \par 1/27/20 Interval history: Pt is s/p right radical nephrectomy\par Continues to have some incisional pain which sometimes makes it difficult for her to be in certain positions\par \par 11/12/20 Interval history: No symptoms since last visit\par hgba1c 8.0, trying to get it under better control\par last bloodwork done by PCP in Oct was otherwise OK\par \par 3/11/21 Interval history: DOing well\par No issues since last visit\par Admits she still has issues with DM2 control\par Recent CT done on 3/2/21 showed no evidence of recurrent disease\par \par 9/13/21 Interval history: some incisional numbness\par Otherwise doing well with no issues\par \par 3/14/22 Interval history: last hgb a1c was 8\par no dysuria, no UTI\par numbness around incision mostly resolved

## 2022-06-06 ENCOUNTER — APPOINTMENT (OUTPATIENT)
Dept: UROLOGY | Facility: CLINIC | Age: 67
End: 2022-06-06
Payer: COMMERCIAL

## 2022-06-06 VITALS
WEIGHT: 165 LBS | HEIGHT: 65 IN | BODY MASS INDEX: 27.49 KG/M2 | SYSTOLIC BLOOD PRESSURE: 133 MMHG | HEART RATE: 100 BPM | DIASTOLIC BLOOD PRESSURE: 74 MMHG | RESPIRATION RATE: 15 BRPM

## 2022-06-06 PROCEDURE — 99213 OFFICE O/P EST LOW 20 MIN: CPT

## 2022-06-12 NOTE — ASSESSMENT
[FreeTextEntry1] : 65 yo F s/p nephrectomy for RCC\par \par - Reviewed recent MRI through [facility] portal and confirmed findings as stated above\par - Repeat MRI in one year

## 2022-06-12 NOTE — HISTORY OF PRESENT ILLNESS
[FreeTextEntry1] : 63 yo F with history of back pain, mid lower back but sometimes travels to flank bilaterally\par Underwent CT as part of workup\par Found to have atrophic right kidney with a renal mass\par Per pt, has known about atrophic right kidney since 31 yrs ago\par no history of UTI\par no history of kidney stones\par \par 1/27/20 Interval history: Pt is s/p right radical nephrectomy\par Continues to have some incisional pain which sometimes makes it difficult for her to be in certain positions\par \par 11/12/20 Interval history: No symptoms since last visit\par hgba1c 8.0, trying to get it under better control\par last bloodwork done by PCP in Oct was otherwise OK\par \par 3/11/21 Interval history: DOing well\par No issues since last visit\par Admits she still has issues with DM2 control\par Recent CT done on 3/2/21 showed no evidence of recurrent disease\par \par 9/13/21 Interval history: some incisional numbness\par Otherwise doing well with no issues\par \par 3/14/22 Interval history: last hgb a1c was 8\par no dysuria, no UTI\par numbness around incision mostly resolved\par \par 6/6/22 Interval history: Recent MRI - no recurrence of disease\par low back pain

## 2022-06-12 NOTE — PHYSICAL EXAM
[General Appearance - Well Developed] : well developed [General Appearance - Well Nourished] : well nourished [Normal Appearance] : normal appearance [Well Groomed] : well groomed [General Appearance - In No Acute Distress] : no acute distress [Abdomen Soft] : soft [Abdomen Tenderness] : non-tender [Costovertebral Angle Tenderness] : no ~M costovertebral angle tenderness [FreeTextEntry1] : incisions c/d/i, keloid [Urinary Bladder Findings] : the bladder was normal on palpation [] : no respiratory distress [Edema] : no peripheral edema [Respiration, Rhythm And Depth] : normal respiratory rhythm and effort [Exaggerated Use Of Accessory Muscles For Inspiration] : no accessory muscle use [Oriented To Time, Place, And Person] : oriented to person, place, and time [Mood] : the mood was normal [Affect] : the affect was normal [Not Anxious] : not anxious [Normal Station and Gait] : the gait and station were normal for the patient's age [No Focal Deficits] : no focal deficits [No Palpable Adenopathy] : no palpable adenopathy

## 2022-09-15 ENCOUNTER — APPOINTMENT (OUTPATIENT)
Dept: UROLOGY | Facility: CLINIC | Age: 67
End: 2022-09-15

## 2022-09-15 VITALS
TEMPERATURE: 98 F | HEIGHT: 65 IN | BODY MASS INDEX: 26.99 KG/M2 | HEART RATE: 88 BPM | SYSTOLIC BLOOD PRESSURE: 127 MMHG | WEIGHT: 162 LBS | DIASTOLIC BLOOD PRESSURE: 86 MMHG

## 2022-09-15 PROCEDURE — 99212 OFFICE O/P EST SF 10 MIN: CPT

## 2022-09-18 NOTE — HISTORY OF PRESENT ILLNESS
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\par Per pt, has known about atrophic right kidney since 31 yrs ago\par no history of UTI\par no history of kidney stones\par \par 1/27/20 Interval history: Pt is s/p right radical nephrectomy\par Continues to have some incisional pain which sometimes makes it difficult for her to be in certain positions\par \par 11/12/20 Interval history: No symptoms since last visit\par [id number] 8.0, trying to get it under better control\par last bloodwork done by PCP in Oct was otherwise OK\par \par 3/11/21 Interval history: DOing well\par No issues since last visit\par Admits she still has issues with DM2 control\par Recent CT done on 3/2/21 showed no evidence of recurrent disease\par \par 9/13/21 Interval history: some incisional numbness\par Otherwise doing well with no issues\par \par 3/14/22 Interval history: last hgb a1c was 8\par no dysuria, no UTI\par numbness around incision mostly resolved\par \par 6/6/22 Interval history: Recent MRI - no recurrence of disease\par low back pain\par \par 9/15/22 Interval history: No issues since last visit

## 2023-02-15 NOTE — BRIEF OPERATIVE NOTE - COMMENTS
Discharge instructions received and reviewed with pt and family at bedside.  Pt voiced understanding and all questions answered to satisfaction.  Stressed importance to making and keeping all follow up appointments.  Medications sent to pt pharmacy and reviewed with pt.  Tele monitor removed and brought to monitor tech.  IV d/c'd with tip intact, pressure dressing applied.  Pt transported to front of hospital via w/c by PCT to be discharged home.    wound class 2 wound class 2  #[id number]

## 2023-06-26 ENCOUNTER — APPOINTMENT (OUTPATIENT)
Age: 68
End: 2023-06-26

## 2023-09-21 ENCOUNTER — APPOINTMENT (OUTPATIENT)
Dept: UROLOGY | Facility: CLINIC | Age: 68
End: 2023-09-21
Payer: COMMERCIAL

## 2023-09-21 VITALS
BODY MASS INDEX: 26.99 KG/M2 | SYSTOLIC BLOOD PRESSURE: 124 MMHG | HEIGHT: 65 IN | DIASTOLIC BLOOD PRESSURE: 80 MMHG | WEIGHT: 162 LBS | TEMPERATURE: 97.1 F | HEART RATE: 101 BPM | OXYGEN SATURATION: 99 % | RESPIRATION RATE: 15 BRPM

## 2023-09-21 DIAGNOSIS — C64.9 MALIGNANT NEOPLASM OF UNSPECIFIED KIDNEY, EXCEPT RENAL PELVIS: ICD-10-CM

## 2023-09-21 PROCEDURE — 99213 OFFICE O/P EST LOW 20 MIN: CPT

## 2023-09-26 PROBLEM — C64.9 RENAL CELL CANCER: Status: ACTIVE | Noted: 2020-01-27

## 2024-09-23 ENCOUNTER — APPOINTMENT (OUTPATIENT)
Age: 69
End: 2024-09-23
Payer: COMMERCIAL

## 2024-09-23 VITALS
WEIGHT: 160 LBS | TEMPERATURE: 98.06 F | OXYGEN SATURATION: 90 % | BODY MASS INDEX: 26.66 KG/M2 | HEART RATE: 81 BPM | SYSTOLIC BLOOD PRESSURE: 127 MMHG | DIASTOLIC BLOOD PRESSURE: 79 MMHG | HEIGHT: 65 IN

## 2024-09-23 DIAGNOSIS — C64.9 MALIGNANT NEOPLASM OF UNSPECIFIED KIDNEY, EXCEPT RENAL PELVIS: ICD-10-CM

## 2024-09-23 PROCEDURE — 99213 OFFICE O/P EST LOW 20 MIN: CPT

## 2024-09-26 NOTE — ASSESSMENT
[FreeTextEntry1] : 68 yo F with history of RCC s/p radical nephrectomy  - MRI abdomen - FU as needed as long as MRI stable

## 2024-09-26 NOTE — ASSESSMENT
[FreeTextEntry1] : 70 yo F with history of RCC s/p radical nephrectomy  - MRI abdomen - FU as needed as long as MRI stable

## 2024-09-26 NOTE — HISTORY OF PRESENT ILLNESS
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 Per pt, has known about atrophic right kidney since 31 yrs ago no history of UTI no history of kidney stones  1/27/20 Interval history: Pt is s/p right radical nephrectomy Continues to have some incisional pain which sometimes makes it difficult for her to be in certain positions  11/12/20 Interval history: No symptoms since last visit hgba1c 8.0, trying to get it under better control last bloodwork done by PCP in Oct was otherwise OK  3/11/21 Interval history: DOing well No issues since last visit Admits she still has issues with DM2 control Recent CT done on 3/2/21 showed no evidence of recurrent disease  9/13/21 Interval history: some incisional numbness Otherwise doing well with no issues  3/14/22 Interval history: last hgb a1c was 8 no dysuria, no UTI numbness around incision mostly resolved  6/6/22 Interval history: Recent MRI - no recurrence of disease low back pain  9/15/23 Interval history: No issues since last visit  9/23/24 Interval history: No issues since last visit no changes in health

## 2024-10-10 ENCOUNTER — APPOINTMENT (OUTPATIENT)
Dept: MRI IMAGING | Facility: CLINIC | Age: 69
End: 2024-10-10
Payer: COMMERCIAL

## 2024-10-10 ENCOUNTER — APPOINTMENT (OUTPATIENT)
Dept: RADIOLOGY | Facility: CLINIC | Age: 69
End: 2024-10-10
Payer: COMMERCIAL

## 2024-10-10 ENCOUNTER — RESULT REVIEW (OUTPATIENT)
Age: 69
End: 2024-10-10

## 2024-10-10 PROCEDURE — 74018 RADEX ABDOMEN 1 VIEW: CPT

## 2024-10-10 PROCEDURE — A9585: CPT | Mod: JW

## 2024-10-10 PROCEDURE — 74183 MRI ABD W/O CNTR FLWD CNTR: CPT
